# Patient Record
Sex: MALE | URBAN - METROPOLITAN AREA
[De-identification: names, ages, dates, MRNs, and addresses within clinical notes are randomized per-mention and may not be internally consistent; named-entity substitution may affect disease eponyms.]

---

## 2023-06-15 ENCOUNTER — OFFICE VISIT (OUTPATIENT)
Dept: URBAN - METROPOLITAN AREA CLINIC 115 | Facility: CLINIC | Age: 53
End: 2023-06-15

## 2023-06-19 ENCOUNTER — WEB ENCOUNTER (OUTPATIENT)
Dept: URBAN - METROPOLITAN AREA CLINIC 115 | Facility: CLINIC | Age: 53
End: 2023-06-19

## 2023-06-22 ENCOUNTER — CLAIMS CREATED FROM THE CLAIM WINDOW (OUTPATIENT)
Dept: URBAN - METROPOLITAN AREA CLINIC 115 | Facility: CLINIC | Age: 53
End: 2023-06-22
Payer: COMMERCIAL

## 2023-06-22 ENCOUNTER — DASHBOARD ENCOUNTERS (OUTPATIENT)
Age: 53
End: 2023-06-22

## 2023-06-22 ENCOUNTER — LAB OUTSIDE AN ENCOUNTER (OUTPATIENT)
Dept: URBAN - METROPOLITAN AREA CLINIC 115 | Facility: CLINIC | Age: 53
End: 2023-06-22

## 2023-06-22 VITALS
WEIGHT: 194.2 LBS | BODY MASS INDEX: 25.74 KG/M2 | TEMPERATURE: 97.5 F | HEART RATE: 74 BPM | DIASTOLIC BLOOD PRESSURE: 79 MMHG | SYSTOLIC BLOOD PRESSURE: 140 MMHG | HEIGHT: 73 IN

## 2023-06-22 DIAGNOSIS — Z12.11 COLON CANCER SCREENING: ICD-10-CM

## 2023-06-22 PROBLEM — 305058001: Status: ACTIVE | Noted: 2023-06-22

## 2023-06-22 PROCEDURE — 99203 OFFICE O/P NEW LOW 30 MIN: CPT | Performed by: INTERNAL MEDICINE

## 2023-06-22 PROCEDURE — G8420 CALC BMI NORM PARAMETERS: HCPCS | Performed by: INTERNAL MEDICINE

## 2023-06-22 PROCEDURE — G9902 PT SCRN TBCO AND ID AS USER: HCPCS | Performed by: INTERNAL MEDICINE

## 2023-06-22 PROCEDURE — G0008 ADMIN INFLUENZA VIRUS VAC: HCPCS | Performed by: INTERNAL MEDICINE

## 2023-06-22 PROCEDURE — 4004F PT TOBACCO SCREEN RCVD TLK: CPT | Performed by: INTERNAL MEDICINE

## 2023-06-22 PROCEDURE — 99202 OFFICE O/P NEW SF 15 MIN: CPT | Performed by: INTERNAL MEDICINE

## 2023-06-22 PROCEDURE — G9906 PT RECV TBCO CESS INTERV: HCPCS | Performed by: INTERNAL MEDICINE

## 2023-06-22 RX ORDER — POLYETHYLENE GLYCOL-3350 AND ELECTROLYTES WITH FLAVOR PACK 240; 5.84; 2.98; 6.72; 22.72 G/278.26G; G/278.26G; G/278.26G; G/278.26G; G/278.26G
AS DIRECTED POWDER, FOR SOLUTION ORAL
Qty: 1 | Refills: 0 | OUTPATIENT
Start: 2023-06-22 | End: 2023-06-23

## 2023-06-22 NOTE — HPI-TODAY'S VISIT:
Patient is a 53 year old white male who is here today for a colon screening. Patient has never had a colonoscopy before. Patient denies blood in stool or constipation. Patient denies having acid reflux. Patient had blood work done a few weeks ago, and his results were normal. Patient denies family history of colon cancer.

## 2023-07-31 ENCOUNTER — TELEPHONE ENCOUNTER (OUTPATIENT)
Dept: URBAN - METROPOLITAN AREA CLINIC 115 | Facility: CLINIC | Age: 53
End: 2023-07-31

## 2023-08-07 ENCOUNTER — CLAIMS CREATED FROM THE CLAIM WINDOW (OUTPATIENT)
Dept: URBAN - METROPOLITAN AREA CLINIC 4 | Facility: CLINIC | Age: 53
End: 2023-08-07
Payer: COMMERCIAL

## 2023-08-07 ENCOUNTER — OFFICE VISIT (OUTPATIENT)
Dept: URBAN - METROPOLITAN AREA SURGERY CENTER 13 | Facility: SURGERY CENTER | Age: 53
End: 2023-08-07
Payer: COMMERCIAL

## 2023-08-07 DIAGNOSIS — Z12.11 COLON CANCER SCREENING: ICD-10-CM

## 2023-08-07 DIAGNOSIS — K63.5 BENIGN COLON POLYP: ICD-10-CM

## 2023-08-07 DIAGNOSIS — K63.89 OTHER SPECIFIED DISEASES OF INTESTINE: ICD-10-CM

## 2023-08-07 PROCEDURE — 45385 COLONOSCOPY W/LESION REMOVAL: CPT | Performed by: INTERNAL MEDICINE

## 2023-08-07 PROCEDURE — 88305 TISSUE EXAM BY PATHOLOGIST: CPT | Performed by: PATHOLOGY

## 2023-08-07 PROCEDURE — G8907 PT DOC NO EVENTS ON DISCHARG: HCPCS | Performed by: INTERNAL MEDICINE

## 2023-12-24 NOTE — PHYSICAL EXAM MUSCULOSKELETAL:
normal gait and station , no tenderness or deformities present , normal gait and station , no tenderness or deformities present [Negative] : Heme/Lymph